# Patient Record
Sex: FEMALE | Race: WHITE | NOT HISPANIC OR LATINO | URBAN - METROPOLITAN AREA
[De-identification: names, ages, dates, MRNs, and addresses within clinical notes are randomized per-mention and may not be internally consistent; named-entity substitution may affect disease eponyms.]

---

## 2022-08-04 DIAGNOSIS — R87.810 ASCUS WITH POSITIVE HIGH RISK HPV CERVICAL: Primary | ICD-10-CM

## 2022-08-04 DIAGNOSIS — R87.610 ASCUS WITH POSITIVE HIGH RISK HPV CERVICAL: Primary | ICD-10-CM

## 2022-09-23 ENCOUNTER — PROCEDURE VISIT (OUTPATIENT)
Dept: GYNECOLOGY | Facility: CLINIC | Age: 32
End: 2022-09-23
Payer: MEDICAID

## 2022-09-23 DIAGNOSIS — R87.610 ASCUS WITH POSITIVE HIGH RISK HPV CERVICAL: Primary | ICD-10-CM

## 2022-09-23 DIAGNOSIS — R87.810 ASCUS WITH POSITIVE HIGH RISK HPV CERVICAL: Primary | ICD-10-CM

## 2022-09-23 DIAGNOSIS — Z23 NEED FOR HPV VACCINATION: ICD-10-CM

## 2022-09-23 LAB
B-HCG UR QL: NEGATIVE
CTP QC/QA: YES

## 2022-09-23 PROCEDURE — 90471 IMMUNIZATION ADMIN: CPT | Mod: PBBFAC

## 2022-09-23 PROCEDURE — 57456 ENDOCERV CURETTAGE W/SCOPE: CPT | Mod: PBBFAC | Performed by: STUDENT IN AN ORGANIZED HEALTH CARE EDUCATION/TRAINING PROGRAM

## 2022-09-23 PROCEDURE — 90651 9VHPV VACCINE 2/3 DOSE IM: CPT | Mod: PBBFAC

## 2022-09-23 PROCEDURE — 81025 URINE PREGNANCY TEST: CPT | Mod: PBBFAC

## 2022-09-23 RX ORDER — MEDROXYPROGESTERONE ACETATE 104 MG/.65ML
INJECTION, SUSPENSION SUBCUTANEOUS
COMMUNITY
Start: 2022-07-20

## 2022-09-23 RX ADMIN — HUMAN PAPILLOMAVIRUS 9-VALENT VACCINE, RECOMBINANT 0.5 ML: 30; 40; 60; 40; 20; 20; 20; 20; 20 INJECTION, SUSPENSION INTRAMUSCULAR at 11:09

## 2022-09-23 NOTE — PROCEDURES
Colposcopy    Date/Time: 2022 9:30 AM  Performed by: Ramona Hills MD  Authorized by: Harshil Patel MD     Consent Done?:  Yes (Verbal) and Yes (Written)  Timeout:Immediately prior to procedure a time out was called to verify the correct patient, procedure, equipment, support staff and site/side marked as required    Colposcopy Site:  Cervix  Position:  Supine  Acrowhite Lesion: No    Atypical Vessels: No    Transformation Zone Adequate?: Yes    Biopsy?: No    ECC Performed?: Yes    LEEP Performed?: No    Estimated blood loss (cc):  1   Patient tolerated the procedure well with no immediate complications.   Post-operative instructions were provided for the patient.   Patient was discharged and will follow up if any complications occur     Adequate visualization of the transformation zone, no acetowhite changes noted.               [facility] Colposcopy Clinic Note    Subjective:     Referring Provider: [facility]     HPI:   Denise Westfall is a 32 y.o.  presents for colposcopy. Results of abnormal pap smear were discussed with patient. Indications/risks/benefits of colposcopy were discussed and consents were signed and witnessed prior to the procedure, all questions answered.      Today the patient has no complaints.    Pap History:   2022 ASCUS HPV16+  2021 NILM, HPV 16/18 negative, OHR positive     Previous excisional procedure:   No    Tobacco use:   No     Contraception:   Depo-Provera     Sexual history:   Currently sexually active with one male partner    STD History:   Hx of CT in , s/p treatment    Review of Systems  A comprehensive review of systems was negative.     Objective:     There were no vitals filed for this visit.    There is no height or weight on file to calculate BMI.    No LMP recorded.    Physical Exam:  General: Alert, cooperative, no distress, appears stated age  Abdomen: Soft, non-tender, no masses  Extremities: Extremities normal, atraumatic, no cyanosis or  edema.  No calf tenderness or erythema.  External genitalia: Normal female genetalia without lesion, discharge or tenderness.  Speculum Exam: Vaginal vault with physiologic discharge, nonodorous, no lesions/masses seen.  Cervical os visualized without lesions/masses. Application of acetic acid revealed absence of acetowhite changes    UPT Result:   Negative    Assessment/Plan:       32 y.o.  with abnormal pap smear s/p colposcopy.     Problem List Items Addressed This Visit    None  Visit Diagnoses       ASCUS with positive high risk HPV cervical    -  Primary    Relevant Orders    POCT urine pregnancy (Completed)    Specimen to Pathology Gynecology and Obstetrics    Need for HPV vaccination        Relevant Medications    hpv vaccine,9-maite (GARDASIL 9) vaccine 0.5 mL (Completed)            Precautions given to the patient to return to ED if vaginal bleeding, fevers, pain, or any other concerns.  Patient expressed understanding and all questions were answered.    Will contact patient with results     Dr Patel was present for the procedure.     Ramona Hills MD   [employer] PGY-2

## 2022-09-26 PROCEDURE — 88305 TISSUE EXAM BY PATHOLOGIST: CPT | Performed by: STUDENT IN AN ORGANIZED HEALTH CARE EDUCATION/TRAINING PROGRAM

## 2022-09-27 LAB
ESTROGEN SERPL-MCNC: NORMAL PG/ML
INSULIN SERPL-ACNC: NORMAL U[IU]/ML
LAB AP CLINICAL INFORMATION: NORMAL
LAB AP GROSS DESCRIPTION: NORMAL
LAB AP REPORT FOOTNOTES: NORMAL
T3RU NFR SERPL: NORMAL %

## 2022-10-03 ENCOUNTER — TELEPHONE (OUTPATIENT)
Dept: GYNECOLOGY | Facility: CLINIC | Age: 32
End: 2022-10-03
Payer: MEDICAID

## 2022-10-03 NOTE — TELEPHONE ENCOUNTER
Called and spoke with pt re colposcopy results with recommendation for repeat pap smear with co-testing in 1 year. Pt voiced understanding, all questions answered.     Ramona Hills MD   [employer] [facility] PGY-2

## 2023-07-19 ENCOUNTER — TELEPHONE (OUTPATIENT)
Dept: GYNECOLOGY | Facility: CLINIC | Age: 33
End: 2023-07-19
Payer: MEDICAID

## 2023-07-19 NOTE — TELEPHONE ENCOUNTER
Called patient to reschedule her shot clinic appt. Voicemail not set up for me to leave on and there is no emergency contact to reach in her chart.